# Patient Record
Sex: FEMALE
[De-identification: names, ages, dates, MRNs, and addresses within clinical notes are randomized per-mention and may not be internally consistent; named-entity substitution may affect disease eponyms.]

---

## 2020-09-01 PROBLEM — Z00.00 ENCOUNTER FOR PREVENTIVE HEALTH EXAMINATION: Status: ACTIVE | Noted: 2020-09-01

## 2020-09-02 ENCOUNTER — APPOINTMENT (OUTPATIENT)
Dept: ORTHOPEDIC SURGERY | Facility: CLINIC | Age: 55
End: 2020-09-02
Payer: COMMERCIAL

## 2020-09-02 VITALS
DIASTOLIC BLOOD PRESSURE: 79 MMHG | BODY MASS INDEX: 24.41 KG/M2 | OXYGEN SATURATION: 96 % | HEART RATE: 67 BPM | WEIGHT: 143 LBS | HEIGHT: 64 IN | SYSTOLIC BLOOD PRESSURE: 117 MMHG

## 2020-09-02 DIAGNOSIS — Z83.3 FAMILY HISTORY OF DIABETES MELLITUS: ICD-10-CM

## 2020-09-02 DIAGNOSIS — Z86.79 PERSONAL HISTORY OF OTHER DISEASES OF THE CIRCULATORY SYSTEM: ICD-10-CM

## 2020-09-02 DIAGNOSIS — Z80.9 FAMILY HISTORY OF MALIGNANT NEOPLASM, UNSPECIFIED: ICD-10-CM

## 2020-09-02 DIAGNOSIS — Z60.2 PROBLEMS RELATED TO LIVING ALONE: ICD-10-CM

## 2020-09-02 DIAGNOSIS — Z87.09 PERSONAL HISTORY OF OTHER DISEASES OF THE RESPIRATORY SYSTEM: ICD-10-CM

## 2020-09-02 DIAGNOSIS — M21.069 VALGUS DEFORMITY, NOT ELSEWHERE CLASSIFIED, UNSPECIFIED KNEE: ICD-10-CM

## 2020-09-02 DIAGNOSIS — M19.031 PRIMARY OSTEOARTHRITIS, RIGHT WRIST: ICD-10-CM

## 2020-09-02 PROCEDURE — 73564 X-RAY EXAM KNEE 4 OR MORE: CPT | Mod: LT

## 2020-09-02 PROCEDURE — 99204 OFFICE O/P NEW MOD 45 MIN: CPT

## 2020-09-02 PROCEDURE — 73501 X-RAY EXAM HIP UNI 1 VIEW: CPT | Mod: LT

## 2020-09-02 PROCEDURE — 73110 X-RAY EXAM OF WRIST: CPT | Mod: RT

## 2020-09-02 RX ORDER — FEXOFENADINE HCL 60 MG
CAPSULE ORAL
Refills: 0 | Status: ACTIVE | COMMUNITY

## 2020-09-02 RX ORDER — LISINOPRIL 20 MG/1
20 TABLET ORAL
Refills: 0 | Status: ACTIVE | COMMUNITY

## 2020-09-02 SDOH — SOCIAL STABILITY - SOCIAL INSECURITY: PROBLEMS RELATED TO LIVING ALONE: Z60.2

## 2020-09-02 NOTE — HISTORY OF PRESENT ILLNESS
[de-identified] : Ms. Banegas is a 54 yo Right-hand-dominant  who presents primarily for knee pain left > right.\par She had knee issues over the years but had been okay for the past 4-5 years. SHe did PT for the knees 5 yrs ago.\par She stumbled on 8/27/20 injuring her right knee. She felt a snap. She didn't fall on the knees. She felt a lot of stiffness and soreness. There is some swelling. She applied ice. She then had another snapping episode getting up from a low toilet seat. She's taken some Tylenol. Pain is better today than it was yesterday and currently is 1/10. It's localized to the anterior knee and dull. Her pain is better with rest and ice and worse bending and sometimes walking. When she gets up after sitting for a while her knees are more stiff.\par She has a history of a RIGHT Baker's cyst in the past. She's had some intermittent giving way in her knees going down stairs quickly but she doesn't do anymore. She can no longer run.\par In the last 5 months or so she's been playing a lot more tenderness and she's been working from home. She plays about 5 days a week.She wears a sleeve on her LEFT knee.\par She also has a lot of tightness in her hip joints and feels discomfort. She's had a history of some back pain and sciatica in the past but not recently.\par She also complains of pain on the ulnar side of her wrist going on for the last month or so. She also does some sewing which aggravates it.

## 2020-09-02 NOTE — REASON FOR VISIT
[Initial Visit] : an initial visit for [Knee Pain] : knee pain [FreeTextEntry2] : hip and wrist pain

## 2020-09-02 NOTE — PHYSICAL EXAM
[Normal RLE] : Right Lower Extremity: No scars, rashes, lesions, ulcers, skin intact [Normal LLE] : Left Lower Extremity: No scars, rashes, lesions, ulcers, skin intact [Normal Touch] : sensation intact for touch [Normal] : Oriented to person, place, and time, insight and judgement were intact and the affect was normal [Slightly Antalgic] : slightly antalgic [UE/LE] : Sensory: Intact in bilateral upper & lower extremities [DP] : dorsalis pedis 2+ and symmetric bilaterally [PT] : posterior tibial 2+ and symmetric bilaterally [Rad] : radial 2+ and symmetric bilaterally [Normal RUE] : Right Upper Extremity: No scars, rashes, lesions, ulcers, skin intact [Normal LUE] : Left Upper Extremity: No scars, rashes, lesions, ulcers, skin intact [de-identified] : Knees:\par slightly antalgic gait. She cannot squat more than about 40°.\par Valgus LEFT greater than RIGHT knee. RIGHT knee slight flexion contracture.\par Trace LEFT knee effusion.No effusion RIGHT knee\par - erythema, edema, warmth.\par Tender LEFT greater than RIGHT knee patella facets and lateral joint line.\par ROM: LEFT knee from 0 degrees extension to 125 degrees flexion with pain on full flexion. RIGHT knee is from about 5° flexion contracture to 125° flexion without pain. Bilateral patellofemoral crepitus\par - Johana.\par 1A Lachman.  - Pivot shift. - posterior drawer. Normal rotational, varus/valgus laxity.\par Intact extensor mechanism.\par NVI distally.\par \par Hips\par Tender mildly over the greater trochanters.\par Hip range of motion is decreased on the RIGHT compared to the LEFT with about 130° flexion and 0 degrees Internal rotation and 35° external rotation. LEFT hip is with 135-140° flexion and 20° internal rotation and 45° external rotation.\par Good strength straight leg raise and hip abduction.\par Negative straight leg raise.\par Normal neurovascular exam distally.\par Curvature in the Thoracolumbar spine on forward flexion consistent with a scoliosis\par \par RIGHT hand and wrist\par no edema, ecchymoses, erythema.\par Full flexion and extension of the fingers and thumb.\par Tender over the ulnar wrist over the TFCC and mildly over the ECU.\par No pain with ulnar deviation of the wrist maximally. Range of motion of the wrist is with about 65° dorsiflexion and 70° palmar flexion. [de-identified] : No respiratory distress or cough [de-identified] : \par x-rays of the knees WB 4 views today show degenerative changes with moderate joint space narrowing, osteophytes and sclerosis There is moderate to severe lateral joint space narrowing increased on the flexed views. Tricompartmental osteophytes.\par X-rays are consistent with moderate to severe osteoarthritis.\par \par AP pelvic x-ray today shows significant narrowing but not bone-on-bone RIGHT hip joint space with osteophytes consistent with moderate osteoarthritis of the RIGHT hip. Minimal degeneration LEFT hip joint.\par \par AP and lateral x-rays of the RIGHT wrist today show mild narrowing of the radiocarpal joint and mild degeneration at the CMC joint consistent with mild arthritis. Ulnar neutral variance but narrowing

## 2020-09-02 NOTE — ASSESSMENT
[FreeTextEntry1] : 54 yo woman Presents with multiple joint issues but today primarily for her knees. She has moderate bilateral knee osteoarthritis tricompartmental but greatest lateral compartment with valgus alignment. She's been functioning very well with the arthritis but it was aggravated by a recent stumble. Hopefully with some rest, ice, NSAIDs and therapy she gets back to better function again. The arthritis typically will progress over time. If she is experiencing more chronic symptoms and cortisone injection or hyaluronic acid injections may be helpful. We discussed nonoperative treatment for arthritis including keeping her weight down and muscles strong and modified activity. Tylenol and NSAIDs as needed. She could try glucosamine and chondroitin sulfate which she used in the past for a month but didn't help.\par \par Her wrist I recommended rest. She stopped going to play tennis for at least a couple weeks now which did help. She should apply ice and she'll take the ibuprofen. Her wrist brace may be helpful as well.\par she has arthritis in her RIGHT hip causing stiffness and discomfort. The ibuprofen and rest likely will help. She should try to get back to crosstraining and low-impact exercises.\par Followup in 6 weeks.

## 2021-01-11 PROBLEM — M25.831 ULNAR ABUTMENT SYNDROME OF RIGHT WRIST: Status: ACTIVE | Noted: 2020-09-02

## 2021-01-12 ENCOUNTER — APPOINTMENT (OUTPATIENT)
Dept: ORTHOPEDIC SURGERY | Facility: CLINIC | Age: 56
End: 2021-01-12

## 2021-01-12 DIAGNOSIS — M25.831 OTHER SPECIFIED JOINT DISORDERS, RIGHT WRIST: ICD-10-CM

## 2021-10-13 ENCOUNTER — APPOINTMENT (OUTPATIENT)
Dept: ORTHOPEDIC SURGERY | Facility: CLINIC | Age: 56
End: 2021-10-13
Payer: COMMERCIAL

## 2021-10-13 DIAGNOSIS — M17.0 BILATERAL PRIMARY OSTEOARTHRITIS OF KNEE: ICD-10-CM

## 2021-10-13 DIAGNOSIS — M16.11 UNILATERAL PRIMARY OSTEOARTHRITIS, RIGHT HIP: ICD-10-CM

## 2021-10-13 PROCEDURE — 73502 X-RAY EXAM HIP UNI 2-3 VIEWS: CPT | Mod: RT

## 2021-10-13 PROCEDURE — 99214 OFFICE O/P EST MOD 30 MIN: CPT

## 2021-10-13 NOTE — ASSESSMENT
[FreeTextEntry1] : 55 yo woman with multiple joint issues with about 10 months of pain in the right groin consistent with the severe right hip osteoarthritis.\par Minimal degenerative changes left hip.  She also has moderate knee arthritis which is not as symptomatic now.\par I spoke to her about the hip arthritis in detail.  We talked about nonoperative and operative treatment options.  I prescribed Celebrex to try 100 to 200 mg/day as needed.  She could take Tylenol on days she does not want to take the Celebrex to minimize risks and side effects.  She should watch her blood pressure.\par Modify activities as needed.  She likes to play tennis which is great but obviously could aggravate the hip.  She will try some physical therapy which hopefully will give her some relief working on some stretching, modalities and gentle strengthening but should stop or modify if it is causing increased pain.\par We talked about natural anti-inflammatory or antioxidant.  She may try turmeric.\par She could try glucosamine and chondroitin sulfate.  There is no pill that cures arthritis.\par We talked about hip replacement surgery which I think is something she will need in the future, possibly in the next couple years.\par She should follow-up as needed.

## 2021-10-13 NOTE — PHYSICAL EXAM
[LE] : Sensory: Intact in bilateral lower extremities [de-identified] : \par Hips\par Tender mildly over the greater trochanters.  Nontender groin\par Coxalgia gait mildly.\par Hip range of motion is decreased on the RIGHT compared to the LEFT with about 110° flexion and 0 degrees Internal rotation and 25° external rotation. LEFT hip is with 135° flexion and 20° internal rotation and 45° external rotation.\par Good strength straight leg raise and hip abduction left leg but weakness and pain in the right groin on straight leg raise\par Intact hip abduction.\par Normal neurovascular exam distally.\par Curvature in the Thoracolumbar spine on forward flexion consistent with a scoliosis\par Knees:\par slightly antalgic gait. She cannot squat more than about 40°.\par Valgus LEFT greater than RIGHT knee. RIGHT knee slight flexion contracture.\par Trace LEFT knee effusion.No effusion RIGHT knee\par - erythema, edema, warmth.\par Tender LEFT greater than RIGHT knee patella facets and lateral joint line.\par ROM: LEFT knee from 0 degrees extension to 125 degrees flexion with pain on full flexion. RIGHT knee is from about 5° flexion contracture to 125° flexion without pain. Bilateral patellofemoral crepitus\par - Johana.\par 1A Lachman.  - Pivot shift. - posterior drawer. Normal rotational, varus/valgus laxity.\par Intact extensor mechanism.\par NVI distally.\par  [de-identified] : No respiratory distress or cough [de-identified] : \par AP pelvis and AP. lateral x-ray of the right hip show severe right  degenerative changes with superior joint space narrowing just about bone-on-bone and osteophytes and subchondral cysts on both sides of the joint.

## 2021-10-13 NOTE — HISTORY OF PRESENT ILLNESS
[de-identified] : Ms. Banegas comes in for right thigh and hip pain.  She was seen once 13 mos ago with knee pain and complained of stiffness in her hips. Pain started in the groin about 10 mos ago. She was playing a lot of tennis. She felt like she had a pull of the "thigh muscle". She was going to come in in Jan but then got Covid and was sick. She got better and just lived with the pain taking some ibuprofen initially. She continued to play tennis. Pain is constant \par Lying on her side at night hurts.\par She walks with her right toes facing out more.  Lifting her leg can be painful.  She tends to just tolerate the pain and is avoiding medication recently.

## 2022-11-07 ENCOUNTER — APPOINTMENT (OUTPATIENT)
Dept: ORTHOPEDIC SURGERY | Facility: CLINIC | Age: 57
End: 2022-11-07

## 2022-11-07 VITALS — HEIGHT: 62 IN | BODY MASS INDEX: 25.76 KG/M2 | WEIGHT: 140 LBS

## 2022-11-07 DIAGNOSIS — M67.88 OTHER SPECIFIED DISORDERS OF SYNOVIUM AND TENDON, OTHER SITE: ICD-10-CM

## 2022-11-07 DIAGNOSIS — M25.572 PAIN IN LEFT ANKLE AND JOINTS OF LEFT FOOT: ICD-10-CM

## 2022-11-07 DIAGNOSIS — G89.29 PAIN IN LEFT ANKLE AND JOINTS OF LEFT FOOT: ICD-10-CM

## 2022-11-07 PROCEDURE — 73610 X-RAY EXAM OF ANKLE: CPT | Mod: LT

## 2022-11-07 PROCEDURE — 99214 OFFICE O/P EST MOD 30 MIN: CPT

## 2022-11-07 RX ORDER — CELECOXIB 100 MG/1
100 CAPSULE ORAL TWICE DAILY
Qty: 30 | Refills: 0 | Status: COMPLETED | COMMUNITY
Start: 2021-10-13 | End: 2022-11-07

## 2022-11-07 NOTE — ASSESSMENT
[FreeTextEntry1] : 57-year-old with left posterior lateral ankle pain and swelling recently without any significant trauma.  I would like an ultrasound to rule out tear of peroneal tendon.  In the meantime she can use compression stockings.  Cane as needed.  Elevate and ice.  NSAIDs.  I will call her with the ultrasound results and further recommendations depending on the findings.  If there is a tear or rupture there is surgical and nonsurgical treatment options.

## 2022-11-07 NOTE — HISTORY OF PRESENT ILLNESS
[de-identified] : 57-year-old status post right total hip replacement a couple months ago has been doing rehabilitation and has some swelling in the posterior lateral left ankle and mild pain about 1-2 out of 10 in severity.  She was told to get this evaluated.  There was no injury or sudden event.  She thought perhaps it was from favoring her right side.  Her right hip is healing well without any significant issues and she is walking without assistive devices and is off of all of her medication that she took postoperatively.

## 2022-11-07 NOTE — PHYSICAL EXAM
[LE] : Sensory: Intact in bilateral lower extremities [Normal RLE] : Right Lower Extremity: No scars, rashes, lesions, ulcers, skin intact [Normal LLE] : Left Lower Extremity: No scars, rashes, lesions, ulcers, skin intact [Normal Touch] : sensation intact for touch [Normal] : Alert and in no acute distress [de-identified] : Left ankle\par Nonantalgic gait.\par She has valgus alignment bilateral knees.\par Edema posterior lateral left ankle.  There is thickening of the tendons posterior to the lateral malleolus.  There is palpable peroneal tendon distal to the lateral malleolus but not sure if it is 1 or 2 tendons.\par No significant tenderness.\par Good eversion strength 5/5.\par Ankle range of motion is with about 8 degrees dorsiflexion and 35 degrees plantarflexion.  Intact passive plantarflexion.\par 5/5 anterior tibial tendon, gastrocsoleus, peroneals, posterior tibial tendon, EHL.\par Sensation is intact.\par Foot is warm with normal capillary refill. [de-identified] : \par X-rays today of the left ankle weightbearing AP, lateral and mortise view showed subtle joint space narrowing of the ankle but mortise is intact.  Soft tissue edema laterally.  Slight degeneration dorsal talonavicular joint